# Patient Record
(demographics unavailable — no encounter records)

---

## 2019-08-18 NOTE — ULT
EXAM: Pelvic ultrasound: Transabdominal and endovaginal ultrasound pelvis performed.



INDICATIONS: Early pregnancy with vaginal spotting. History of miscarriage.



COMPARISON: None.



FINDINGS: A gestational sac is identified. A fetal pole is identified. Crown-rump length indicates a 
6 week 4 day gestational age. Fetal heart rate is recorded at 131 bpm.

Yolk sac identified.

A subtle hypoechoic area seen posterior to the gestational sac with Doppler is noted by the technolog
ist. This may represent a small area of subchorionic hemorrhage but is not definitely confirmed.

Right ovarian cyst is seen measuring 1.5 cm. Ovaries otherwise unremarkable. Color Doppler with spect
ral analysis shows blood flow to both ovaries.



IMPRESSION: 

1. Viable intrauterine pregnancy. Gestational age by ultrasound is 6 weeks 4 days.

2. Question small subchorionic hemorrhage as described above. Follow-up recommended.



Reported By: Nirmal Kinsey 

Electronically Signed:  8/18/2019 11:51 AM

## 2020-03-23 NOTE — PDOC.LDHP
Labor and Delivery H&P


Chief complaint: contractions, loss of fluid (SROM at home at 0400)


HPI: 





 at 38 weeks with SROM at 0400. CTX started after that. Quickly became 

intense. Presented to L&D in active labor.


Current gestational age (weeks): 38


Due date: 20


Dating criteria: last menstrual period, first trimester ultrasound


Grav: 5


Para: 1


OB History Details: 





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 


Current pregnancy complications: none


Abnormal US findings: No


Current medications: pre- vitamins


Previous surgical history: none


Allergies/Adverse Reactions: 


 Allergies











Allergy/AdvReac Type Severity Reaction Status Date / Time


 


[facility] Mild Rash Verified 20 04:42











Social history: none





- Physical Exam


Vital signs reviewed and normal: yes


General: breathing through contractions


Heart: RRR


Lungs: CTAB


Abdomen: gravid


Extremeties: no edema


FHT: category 1





- Vaginal Exam


cm dilated: 5


Effacement: 100%


Station: -1





- OB Labs


Blood type: A


RH: positive


Antibody Screen: negative


HIV: negative


RPR: negative


HEPSAg: negative


1 hour GCT: negative


GBS: negative


Urine drug screen: not done


Rubella: immune





- Assessment


L&D Assessment: term patient in labor





- Plan


Plan: admit to L&D (Patient rapidly became complete. Unable to get epidural. 

See delivery note for details.)

## 2020-03-23 NOTE — PDOC.OPDEL
OB Operative/Delivery Note


Delivery Dr/Surgeon: Fuentes


Pre-Delivery Diagnosis: active labor


Procedure/Post Delivery Dx: spontaneous vaginal delivery (Head OA, no nuchal 

cord, shoulder dystocia relieved with McRobert's, suprapubic pressure, Wood's 

screw maneuver, left shoulder delivered first. Body easily followed.  Cord 

doubly clamped and cut.  Infant handed to waiting [facility] team.)


Weeks gestation: 38


Anesthesia: local





- Findings


  ** A


Sex: female


Weight: 8 lb 8 oz





- Additional Findings/Plan


Placenta delivered: spontaneous


Repaired Obstetrical Laceration: 1st degree (Repaired with 3.0 vicryl under 

local anesthesia in standard running fashion.)


Estimated blood loss: 350


Post delivery plan: routine recovery

## 2020-03-24 NOTE — PDOC.PP
Post Partum Progress Note


Post Partum Day #: 1


Subjective: 





Doing well. baby improving in NICU. Breastfeeding well. Pain better than after 

delivery, just a little sore.


PO intake tolerated: yes


Flatus: yes


Ambulation: yes


 Vital Signs (12 hours)











  Temp Pulse Resp BP Pulse Ox


 


 03/24/20 07:44  98.8 F  111 H  20  130/86  98


 


 03/24/20 04:00  98.5 F  87  16  124/77 








 Weight











Weight                         198 lb

















- Physical Examination


General: NAD


Cardiovascular: no m/r/g, RRR


Respiratory: clear to auscultation bilaterally, non-labored breathing


Abdominal: + bowel sounds, lochia, no distention, appropriately TTP (Uterus sore

, no tenderness)


Extremities: negative homans (B)


Neurological: no gross focal deficits


Psychiatric: A&Ox3


Result Diagrams: 


 03/24/20 05:13





Additional Labs: 


 Post Partum Labs











Blood Type  A POSITIVE   03/23/20  05:21    


 


Hep Bs Antigen  Non-Reactive S/CO (NonReactive)   03/23/20  05:21    











(1) Spontaneous vaginal delivery


Code(s): O80 - ENCOUNTER FOR FULL-TERM UNCOMPLICATED DELIVERY   Status: Acute   





- Assessment/Plan





Routine PP care


Continue to breastfeed


Likely D/C tomorrow


May B&B if baby needs to remain in NICU longer - doing well right now

## 2020-03-25 NOTE — PDOC.PP
Post Partum Progress Note


Post Partum Day #: 2


Subjective: 





Doing well. No c/o. Feeling well. Pain controlled.


PO intake tolerated: yes


Flatus: yes


Ambulation: yes


 Vital Signs (12 hours)











  Temp Pulse Resp BP Pulse Ox


 


 03/25/20 08:45      98


 


 03/25/20 07:28  98.1 F  82  20  133/72  98


 


 03/25/20 05:00   95   








 Weight











Weight                         198 lb

















- Physical Examination


General: NAD


Cardiovascular: no m/r/g, RRR


Respiratory: clear to auscultation bilaterally, non-labored breathing


Abdominal: + bowel sounds, lochia, no distention, appropriately TTP


Extremities: negative homans (B)


Neurological: no gross focal deficits


Psychiatric: A&Ox3


Result Diagrams: 


 03/24/20 05:13





Additional Labs: 


 Post Partum Labs











Blood Type  A POSITIVE   03/23/20  05:21    


 


Hep Bs Antigen  Non-Reactive S/CO (NonReactive)   03/23/20  05:21    











(1) Spontaneous vaginal delivery


Code(s): O80 - ENCOUNTER FOR FULL-TERM UNCOMPLICATED DELIVERY   Status: Acute   





- Assessment/Plan





Routine PP care


D/C to B&B later today


Baby still in NICU - probably one more day


F/U in 6 weeks.